# Patient Record
Sex: MALE | Race: OTHER | URBAN - METROPOLITAN AREA
[De-identification: names, ages, dates, MRNs, and addresses within clinical notes are randomized per-mention and may not be internally consistent; named-entity substitution may affect disease eponyms.]

---

## 2023-12-06 ENCOUNTER — APPOINTMENT (OUTPATIENT)
Dept: SURGERY | Facility: CLINIC | Age: 68
End: 2023-12-06
Payer: MEDICAID

## 2023-12-14 ENCOUNTER — OFFICE VISIT (OUTPATIENT)
Dept: SURGERY | Facility: CLINIC | Age: 68
End: 2023-12-14
Payer: MEDICAID

## 2023-12-14 NOTE — PROGRESS NOTES
Subjective   Patient ID: Jeremy Chiang is a 68 y.o. male who presents for New Patient Visit (hemorrhoids).    HPI  Review of Systems  Physical Exam    Objective     No diagnosis found.   There is no problem list on file for this patient.     No Known Allergies   Medication Documentation Review Audit       Reviewed by Mayela Seo MA (Medical Assistant) on 12/14/23 at 0813      Medication Order Taking? Sig Documenting Provider Last Dose Status            No Medications to Display                                   History reviewed. No pertinent past medical history.  Social History     Tobacco Use   Smoking Status Never   Smokeless Tobacco Never     Family History   Family history unknown: Yes      History reviewed. No pertinent surgical history.    Assessment/Plan         Teofilo Harris MD

## 2025-01-27 ENCOUNTER — APPOINTMENT (OUTPATIENT)
Dept: SURGERY | Facility: CLINIC | Age: 70
End: 2025-01-27
Payer: MEDICAID

## 2025-01-27 DIAGNOSIS — K42.0 RECURRENT UMBILICAL HERNIA WITH INCARCERATION: Primary | ICD-10-CM

## 2025-01-27 PROCEDURE — 99214 OFFICE O/P EST MOD 30 MIN: CPT | Performed by: SURGERY

## 2025-01-27 PROCEDURE — 1160F RVW MEDS BY RX/DR IN RCRD: CPT | Performed by: SURGERY

## 2025-01-27 PROCEDURE — 1159F MED LIST DOCD IN RCRD: CPT | Performed by: SURGERY

## 2025-01-27 PROCEDURE — 1036F TOBACCO NON-USER: CPT | Performed by: SURGERY
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
